# Patient Record
Sex: MALE | Race: WHITE | NOT HISPANIC OR LATINO | Employment: FULL TIME | URBAN - NONMETROPOLITAN AREA
[De-identification: names, ages, dates, MRNs, and addresses within clinical notes are randomized per-mention and may not be internally consistent; named-entity substitution may affect disease eponyms.]

---

## 2017-01-04 ENCOUNTER — OFFICE VISIT (OUTPATIENT)
Dept: URGENT CARE | Facility: PHYSICIAN GROUP | Age: 27
End: 2017-01-04
Payer: MEDICAID

## 2017-01-04 VITALS
SYSTOLIC BLOOD PRESSURE: 110 MMHG | BODY MASS INDEX: 30.54 KG/M2 | DIASTOLIC BLOOD PRESSURE: 70 MMHG | HEIGHT: 74 IN | WEIGHT: 238 LBS | OXYGEN SATURATION: 95 % | HEART RATE: 102 BPM | TEMPERATURE: 99 F | RESPIRATION RATE: 20 BRPM

## 2017-01-04 DIAGNOSIS — J02.9 SORE THROAT: ICD-10-CM

## 2017-01-04 DIAGNOSIS — J06.9 URI WITH COUGH AND CONGESTION: ICD-10-CM

## 2017-01-04 LAB
INT CON NEG: NEGATIVE
INT CON POS: POSITIVE
S PYO AG THROAT QL: NEGATIVE

## 2017-01-04 PROCEDURE — 87880 STREP A ASSAY W/OPTIC: CPT | Performed by: PHYSICIAN ASSISTANT

## 2017-01-04 PROCEDURE — 99203 OFFICE O/P NEW LOW 30 MIN: CPT | Performed by: PHYSICIAN ASSISTANT

## 2017-01-04 NOTE — PROGRESS NOTES
Chief Complaint   Patient presents with   • Cough     2 days cough and sore throat       HISTORY OF PRESENT ILLNESS: Patient is a 26 y.o. male who presents today for evaluation of a 2 day history of cough and sore throat. Patient states the throat became significantly worse yesterday. Over-the-counter cough drops have not been helping.  Patient reports mild nasal congestion and ear pressure. He denies fever, shortness of breath, nausea, vomiting, diarrhea. He has not had any difficulty sleeping at night because of the cough. He reports that he is a smoker and he always has a cough.     Patient Active Problem List    Diagnosis Date Noted   • Depression 03/27/2012   • Anxiety 03/27/2012       Allergies:Review of patient's allergies indicates no known allergies.    Current Outpatient Prescriptions Ordered in [facility]   Medication Sig Dispense Refill   • maalox plus-benadryl-visc lidocaine (MAGIC MOUTHWASH) Take 5 mL by mouth every 6 hours as needed (sore throat). 1 Bottle 0   • buPROPion (WELLBUTRIN XL) 300 MG XL tablet   1     No current [facility]-ordered facility-administered medications on file.       Past Medical History   Diagnosis Date   • Depression 3/27/2012   • Anxiety 3/27/2012       Social History   Substance Use Topics   • Smoking status: Current Every Day Smoker -- 1.00 packs/day   • Smokeless tobacco: None   • Alcohol Use: 0.5 oz/week     1 Standard drinks or equivalent per week       Family Status   Relation Status Death Age   • Mother Alive      Family History   Problem Relation Age of Onset   • Psychiatry Mother      schizophrenia   • Psychiatry Father      scizophrenia       ROS:   Review of Systems   Constitutional: Negative for fever, chills, weight loss and malaise/fatigue.   HENT: Negative for ear pain, nosebleeds, and neck pain.    Eyes: Negative for blurred vision.   Respiratory: Negative for sputum production, shortness of breath and wheezing.    Cardiovascular: Negative for chest pain, palpitations,  "orthopnea and leg swelling.   Gastrointestinal: Negative for heartburn, nausea, vomiting and abdominal pain.   Genitourinary: Negative for dysuria, urgency and frequency.       Exam:  Blood pressure 110/70, pulse 102, temperature 37.2 °C (99 °F), resp. rate 20, height 1.88 m (6' 2.02\"), weight 107.956 kg (238 lb), SpO2 95 %.  General: Normal appearing. No distress.  HEENT: Conjunctiva clear, lids without ptosis, ears normal shape and contour, canals are clear bilaterally, tympanic membranes are benign, nasal mucosa benign, oropharynx is without erythema, edema or exudates.  Pulmonary: Clear to ausculation and percussion.  Normal effort. No rales, ronchi, or wheezing.   Cardiovascular: Regular rate and rhythm without murmur.   Neurologic: Grossly nonfocal.  Lymph: No cervical lymphadenopathy noted.  Skin: No obvious lesions.  Psych: Normal mood. Alert and oriented x3. Judgment and insight is normal.    Rapid strep: Negative    Assessment/Plan:  Discussed likely viral etiology. Discussed appropriate over-the-counter symptomatic medication, and when to return to clinic. Take all medications as directed. Follow up for worsening or persistent symptoms.   1. URI with cough and congestion     2. Sore throat  POCT Rapid Strep A    maalox plus-benadryl-visc lidocaine (MAGIC MOUTHWASH)         "